# Patient Record
Sex: FEMALE | Race: WHITE | NOT HISPANIC OR LATINO | Employment: UNEMPLOYED | URBAN - METROPOLITAN AREA
[De-identification: names, ages, dates, MRNs, and addresses within clinical notes are randomized per-mention and may not be internally consistent; named-entity substitution may affect disease eponyms.]

---

## 2021-11-19 ENCOUNTER — TRANSCRIBE ORDERS (OUTPATIENT)
Dept: ADMINISTRATIVE | Facility: HOSPITAL | Age: 31
End: 2021-11-19

## 2021-11-19 DIAGNOSIS — Z36.9 1ST TRIMESTER SCREENING: Primary | ICD-10-CM

## 2021-11-24 ENCOUNTER — HOSPITAL ENCOUNTER (OUTPATIENT)
Dept: ULTRASOUND IMAGING | Facility: HOSPITAL | Age: 31
Discharge: HOME | End: 2021-11-24

## 2021-11-24 ENCOUNTER — TRANSCRIBE ORDERS (OUTPATIENT)
Dept: ADMINISTRATIVE | Facility: HOSPITAL | Age: 31
End: 2021-11-24

## 2021-11-24 ENCOUNTER — LAB (OUTPATIENT)
Dept: LAB | Facility: HOSPITAL | Age: 31
End: 2021-11-24

## 2021-11-24 DIAGNOSIS — Z36.9 ANTENATAL SCREENING ENCOUNTER: ICD-10-CM

## 2021-11-24 DIAGNOSIS — Z36.9 ANTENATAL SCREENING ENCOUNTER: Primary | ICD-10-CM

## 2021-11-24 DIAGNOSIS — Z36.9 1ST TRIMESTER SCREENING: ICD-10-CM

## 2021-11-24 PROCEDURE — 76811 OB US DETAILED SNGL FETUS: CPT

## 2021-12-16 LAB
EXTERNAL HEPATITIS B SURFACE ANTIGEN: NEGATIVE
EXTERNAL RUBELLA QUALITATIVE: NORMAL
EXTERNAL SYPHILIS ANTIBODY: NORMAL
HIV1 P24 AG SERPL QL IA: NORMAL

## 2022-01-12 ENCOUNTER — TRANSCRIBE ORDERS (OUTPATIENT)
Dept: ADMINISTRATIVE | Facility: HOSPITAL | Age: 32
End: 2022-01-12

## 2022-01-12 DIAGNOSIS — Z15.89 MTHFR MUTATION (METHYLENETETRAHYDROFOLATE REDUCTASE): Primary | ICD-10-CM

## 2022-01-27 ENCOUNTER — TRANSCRIBE ORDERS (OUTPATIENT)
Dept: ADMINISTRATIVE | Facility: HOSPITAL | Age: 32
End: 2022-01-27

## 2022-01-27 ENCOUNTER — HOSPITAL ENCOUNTER (OUTPATIENT)
Dept: ULTRASOUND IMAGING | Facility: HOSPITAL | Age: 32
Discharge: HOME | End: 2022-01-27
Admitting: OBSTETRICS & GYNECOLOGY

## 2022-01-27 DIAGNOSIS — E72.12 MTHFR DEFICIENCY COMPLICATING PREGNANCY, UNSPECIFIED TRIMESTER: Primary | ICD-10-CM

## 2022-01-27 DIAGNOSIS — Z15.89 MTHFR MUTATION (METHYLENETETRAHYDROFOLATE REDUCTASE): ICD-10-CM

## 2022-01-27 DIAGNOSIS — O99.280 MTHFR DEFICIENCY COMPLICATING PREGNANCY, UNSPECIFIED TRIMESTER: Primary | ICD-10-CM

## 2022-01-27 PROCEDURE — 76805 OB US >/= 14 WKS SNGL FETUS: CPT

## 2022-02-08 ENCOUNTER — HOSPITAL ENCOUNTER (OUTPATIENT)
Dept: ULTRASOUND IMAGING | Facility: HOSPITAL | Age: 32
Discharge: HOME | End: 2022-02-08
Admitting: OBSTETRICS & GYNECOLOGY

## 2022-02-08 DIAGNOSIS — E72.12 MTHFR DEFICIENCY COMPLICATING PREGNANCY, UNSPECIFIED TRIMESTER: ICD-10-CM

## 2022-02-08 DIAGNOSIS — O99.280 MTHFR DEFICIENCY COMPLICATING PREGNANCY, UNSPECIFIED TRIMESTER: ICD-10-CM

## 2022-02-08 PROCEDURE — 76818 FETAL BIOPHYS PROFILE W/NST: CPT

## 2022-02-11 LAB — EXTERNAL GROUP B STREP ANTIGEN: NORMAL

## 2022-02-14 ENCOUNTER — APPOINTMENT (OUTPATIENT)
Dept: LABOR AND DELIVERY | Facility: HOSPITAL | Age: 32
End: 2022-02-14

## 2022-02-15 ENCOUNTER — HOSPITAL ENCOUNTER (OUTPATIENT)
Dept: ULTRASOUND IMAGING | Facility: HOSPITAL | Age: 32
Discharge: HOME | End: 2022-02-15
Admitting: OBSTETRICS & GYNECOLOGY

## 2022-02-15 DIAGNOSIS — E72.12 MTHFR DEFICIENCY COMPLICATING PREGNANCY, UNSPECIFIED TRIMESTER: ICD-10-CM

## 2022-02-15 DIAGNOSIS — O99.280 MTHFR DEFICIENCY COMPLICATING PREGNANCY, UNSPECIFIED TRIMESTER: ICD-10-CM

## 2022-02-15 PROCEDURE — 76818 FETAL BIOPHYS PROFILE W/NST: CPT

## 2022-02-23 ENCOUNTER — TRANSCRIBE ORDERS (OUTPATIENT)
Dept: ADMINISTRATIVE | Facility: HOSPITAL | Age: 32
End: 2022-02-23

## 2022-02-23 DIAGNOSIS — Z15.89 MTHFR MUTATION (METHYLENETETRAHYDROFOLATE REDUCTASE): Primary | ICD-10-CM

## 2022-02-25 ENCOUNTER — HOSPITAL ENCOUNTER (OUTPATIENT)
Dept: ULTRASOUND IMAGING | Facility: HOSPITAL | Age: 32
Discharge: HOME | End: 2022-02-25
Admitting: OBSTETRICS & GYNECOLOGY

## 2022-02-25 DIAGNOSIS — O99.280 MTHFR DEFICIENCY COMPLICATING PREGNANCY, UNSPECIFIED TRIMESTER: ICD-10-CM

## 2022-02-25 DIAGNOSIS — E72.12 MTHFR DEFICIENCY COMPLICATING PREGNANCY, UNSPECIFIED TRIMESTER: ICD-10-CM

## 2022-02-25 PROCEDURE — 76818 FETAL BIOPHYS PROFILE W/NST: CPT

## 2022-03-03 ENCOUNTER — HOSPITAL ENCOUNTER (INPATIENT)
Facility: HOSPITAL | Age: 32
LOS: 2 days | Discharge: HOME | End: 2022-03-05
Attending: OBSTETRICS & GYNECOLOGY | Admitting: OBSTETRICS & GYNECOLOGY

## 2022-03-03 LAB
ABO GROUP BLD: NORMAL
ABO GROUP BLD: NORMAL
BLD GP AB SCN SERPL QL: NEGATIVE
DEPRECATED RDW RBC AUTO: 41.9 FL (ref 37–54)
ERYTHROCYTE [DISTWIDTH] IN BLOOD BY AUTOMATED COUNT: 12.8 % (ref 12.3–15.4)
HCT VFR BLD AUTO: 30.6 % (ref 34–46.6)
HGB BLD-MCNC: 10.8 G/DL (ref 12–15.9)
MCH RBC QN AUTO: 32 PG (ref 26.6–33)
MCHC RBC AUTO-ENTMCNC: 35.3 G/DL (ref 31.5–35.7)
MCV RBC AUTO: 90.5 FL (ref 79–97)
PLATELET # BLD AUTO: 202 10*3/MM3 (ref 140–450)
PMV BLD AUTO: 9.7 FL (ref 6–12)
RBC # BLD AUTO: 3.38 10*6/MM3 (ref 3.77–5.28)
RH BLD: POSITIVE
RH BLD: POSITIVE
T&S EXPIRATION DATE: NORMAL
WBC NRBC COR # BLD: 8.52 10*3/MM3 (ref 3.4–10.8)

## 2022-03-03 PROCEDURE — 86901 BLOOD TYPING SEROLOGIC RH(D): CPT

## 2022-03-03 PROCEDURE — U0004 COV-19 TEST NON-CDC HGH THRU: HCPCS | Performed by: OBSTETRICS & GYNECOLOGY

## 2022-03-03 PROCEDURE — 86900 BLOOD TYPING SEROLOGIC ABO: CPT

## 2022-03-03 PROCEDURE — 86901 BLOOD TYPING SEROLOGIC RH(D): CPT | Performed by: OBSTETRICS & GYNECOLOGY

## 2022-03-03 PROCEDURE — 85027 COMPLETE CBC AUTOMATED: CPT | Performed by: OBSTETRICS & GYNECOLOGY

## 2022-03-03 PROCEDURE — 86850 RBC ANTIBODY SCREEN: CPT | Performed by: OBSTETRICS & GYNECOLOGY

## 2022-03-03 PROCEDURE — 86900 BLOOD TYPING SEROLOGIC ABO: CPT | Performed by: OBSTETRICS & GYNECOLOGY

## 2022-03-03 RX ORDER — LIDOCAINE HYDROCHLORIDE 10 MG/ML
5 INJECTION, SOLUTION EPIDURAL; INFILTRATION; INTRACAUDAL; PERINEURAL AS NEEDED
Status: DISCONTINUED | OUTPATIENT
Start: 2022-03-03 | End: 2022-03-04

## 2022-03-03 RX ORDER — SODIUM CHLORIDE, SODIUM LACTATE, POTASSIUM CHLORIDE, CALCIUM CHLORIDE 600; 310; 30; 20 MG/100ML; MG/100ML; MG/100ML; MG/100ML
125 INJECTION, SOLUTION INTRAVENOUS CONTINUOUS
Status: DISCONTINUED | OUTPATIENT
Start: 2022-03-03 | End: 2022-03-04

## 2022-03-03 RX ORDER — SODIUM CHLORIDE 0.9 % (FLUSH) 0.9 %
10 SYRINGE (ML) INJECTION EVERY 12 HOURS SCHEDULED
Status: DISCONTINUED | OUTPATIENT
Start: 2022-03-03 | End: 2022-03-04

## 2022-03-03 RX ORDER — MAGNESIUM CARB/ALUMINUM HYDROX 105-160MG
30 TABLET,CHEWABLE ORAL ONCE
Status: DISCONTINUED | OUTPATIENT
Start: 2022-03-03 | End: 2022-03-04

## 2022-03-03 RX ORDER — ASPIRIN 81 MG/1
81 TABLET, CHEWABLE ORAL DAILY
COMMUNITY

## 2022-03-03 RX ORDER — FOLIC ACID 1 MG/1
1 TABLET ORAL DAILY
COMMUNITY

## 2022-03-03 RX ORDER — SODIUM CHLORIDE 0.9 % (FLUSH) 0.9 %
10 SYRINGE (ML) INJECTION AS NEEDED
Status: DISCONTINUED | OUTPATIENT
Start: 2022-03-03 | End: 2022-03-04

## 2022-03-03 RX ADMIN — SODIUM CHLORIDE, POTASSIUM CHLORIDE, SODIUM LACTATE AND CALCIUM CHLORIDE 999 ML/HR: 600; 310; 30; 20 INJECTION, SOLUTION INTRAVENOUS at 21:35

## 2022-03-03 RX ADMIN — SODIUM CHLORIDE, POTASSIUM CHLORIDE, SODIUM LACTATE AND CALCIUM CHLORIDE 125 ML/HR: 600; 310; 30; 20 INJECTION, SOLUTION INTRAVENOUS at 22:36

## 2022-03-04 ENCOUNTER — APPOINTMENT (OUTPATIENT)
Dept: ULTRASOUND IMAGING | Facility: HOSPITAL | Age: 32
End: 2022-03-04

## 2022-03-04 PROBLEM — Z3A.38 38 WEEKS GESTATION OF PREGNANCY: Status: RESOLVED | Noted: 2022-03-04 | Resolved: 2022-03-04

## 2022-03-04 PROBLEM — Z3A.38 38 WEEKS GESTATION OF PREGNANCY: Status: ACTIVE | Noted: 2022-03-04

## 2022-03-04 LAB
BASE EXCESS BLDCOA CALC-SCNC: 2.5 MMOL/L
HCO3 BLDCOA-SCNC: 26 MMOL/L
PCO2 BLDCOA: 36.8 MMHG (ref 33–49)
PH BLDCOA: 7.47 PH UNITS (ref 7.21–7.31)
PO2 BLDCOA: <40.5 MMHG
SARS-COV-2 RNA PNL SPEC NAA+PROBE: NOT DETECTED

## 2022-03-04 PROCEDURE — 82803 BLOOD GASES ANY COMBINATION: CPT | Performed by: OBSTETRICS & GYNECOLOGY

## 2022-03-04 RX ORDER — MISOPROSTOL 200 UG/1
600 TABLET ORAL ONCE
Status: COMPLETED | OUTPATIENT
Start: 2022-03-04 | End: 2022-03-04

## 2022-03-04 RX ORDER — METHYLERGONOVINE MALEATE 0.2 MG/ML
200 INJECTION INTRAVENOUS ONCE AS NEEDED
Status: DISCONTINUED | OUTPATIENT
Start: 2022-03-04 | End: 2022-03-04 | Stop reason: HOSPADM

## 2022-03-04 RX ORDER — MISOPROSTOL 200 UG/1
800 TABLET ORAL ONCE AS NEEDED
Status: DISCONTINUED | OUTPATIENT
Start: 2022-03-04 | End: 2022-03-04 | Stop reason: HOSPADM

## 2022-03-04 RX ORDER — ONDANSETRON 4 MG/1
4 TABLET, FILM COATED ORAL EVERY 8 HOURS PRN
Status: DISCONTINUED | OUTPATIENT
Start: 2022-03-04 | End: 2022-03-05 | Stop reason: HOSPADM

## 2022-03-04 RX ORDER — MISOPROSTOL 200 UG/1
TABLET ORAL
Status: DISPENSED
Start: 2022-03-04 | End: 2022-03-04

## 2022-03-04 RX ORDER — FERROUS SULFATE 325(65) MG
325 TABLET ORAL 2 TIMES DAILY WITH MEALS
Status: DISCONTINUED | OUTPATIENT
Start: 2022-03-04 | End: 2022-03-05 | Stop reason: HOSPADM

## 2022-03-04 RX ORDER — IBUPROFEN 600 MG/1
600 TABLET ORAL EVERY 6 HOURS
Status: DISCONTINUED | OUTPATIENT
Start: 2022-03-04 | End: 2022-03-05 | Stop reason: HOSPADM

## 2022-03-04 RX ORDER — DOCUSATE SODIUM 100 MG/1
100 CAPSULE, LIQUID FILLED ORAL DAILY
Status: DISCONTINUED | OUTPATIENT
Start: 2022-03-04 | End: 2022-03-05 | Stop reason: HOSPADM

## 2022-03-04 RX ORDER — CALCIUM CARBONATE 200(500)MG
1 TABLET,CHEWABLE ORAL 3 TIMES DAILY PRN
Status: DISCONTINUED | OUTPATIENT
Start: 2022-03-04 | End: 2022-03-05 | Stop reason: HOSPADM

## 2022-03-04 RX ORDER — HYDROCODONE BITARTRATE AND ACETAMINOPHEN 5; 325 MG/1; MG/1
1 TABLET ORAL EVERY 4 HOURS PRN
Status: DISCONTINUED | OUTPATIENT
Start: 2022-03-04 | End: 2022-03-05 | Stop reason: HOSPADM

## 2022-03-04 RX ORDER — ASPIRIN 81 MG/1
81 TABLET, CHEWABLE ORAL DAILY
Status: DISCONTINUED | OUTPATIENT
Start: 2022-03-04 | End: 2022-03-05 | Stop reason: HOSPADM

## 2022-03-04 RX ORDER — LIDOCAINE HYDROCHLORIDE 10 MG/ML
INJECTION, SOLUTION EPIDURAL; INFILTRATION; INTRACAUDAL; PERINEURAL
Status: DISCONTINUED
Start: 2022-03-04 | End: 2022-03-04 | Stop reason: WASHOUT

## 2022-03-04 RX ORDER — IBUPROFEN 600 MG/1
600 TABLET ORAL EVERY 6 HOURS SCHEDULED
Status: DISCONTINUED | OUTPATIENT
Start: 2022-03-04 | End: 2022-03-04

## 2022-03-04 RX ORDER — CARBOPROST TROMETHAMINE 250 UG/ML
250 INJECTION, SOLUTION INTRAMUSCULAR
Status: DISCONTINUED | OUTPATIENT
Start: 2022-03-04 | End: 2022-03-04 | Stop reason: HOSPADM

## 2022-03-04 RX ORDER — OXYTOCIN-SODIUM CHLORIDE 0.9% IV SOLN 30 UNIT/500ML 30-0.9/5 UT/ML-%
SOLUTION INTRAVENOUS
Status: COMPLETED
Start: 2022-03-04 | End: 2022-03-04

## 2022-03-04 RX ADMIN — WITCH HAZEL 1 PAD: 500 SOLUTION RECTAL; TOPICAL at 03:28

## 2022-03-04 RX ADMIN — FERROUS SULFATE TAB 325 MG (65 MG ELEMENTAL FE) 325 MG: 325 (65 FE) TAB at 07:37

## 2022-03-04 RX ADMIN — IBUPROFEN 600 MG: 600 TABLET ORAL at 22:27

## 2022-03-04 RX ADMIN — IBUPROFEN 600 MG: 600 TABLET ORAL at 16:33

## 2022-03-04 RX ADMIN — ASPIRIN 81 MG CHEWABLE TABLET 81 MG: 81 TABLET CHEWABLE at 08:51

## 2022-03-04 RX ADMIN — IBUPROFEN 600 MG: 600 TABLET ORAL at 03:27

## 2022-03-04 RX ADMIN — OXYTOCIN 30 UNITS: 10 INJECTION, SOLUTION INTRAMUSCULAR; INTRAVENOUS at 02:35

## 2022-03-04 RX ADMIN — Medication: at 03:28

## 2022-03-04 RX ADMIN — MISOPROSTOL 600 MCG: 200 TABLET ORAL at 07:37

## 2022-03-04 RX ADMIN — IBUPROFEN 600 MG: 600 TABLET ORAL at 10:46

## 2022-03-04 RX ADMIN — FERROUS SULFATE TAB 325 MG (65 MG ELEMENTAL FE) 325 MG: 325 (65 FE) TAB at 17:21

## 2022-03-04 RX ADMIN — DOCUSATE SODIUM 100 MG: 100 CAPSULE ORAL at 08:13

## 2022-03-04 NOTE — L&D DELIVERY NOTE
CANDI Gibbons  Vaginal Delivery Note   Review the Delivery Report for details.       Delivery     Delivery: Vaginal, Spontaneous     YOB: 2023    Time of Birth:  Gestational Age 2:27 AM   38w2d     Anesthesia: None     Delivering clinician: Faby Lewis    Forceps?   No   Vacuum? No    Shoulder dystocia present: No        Delivery narrative: Presented in spontaneous labor, progressed to stage II labor, spontaneous rupture of membranes clear fluids.  Spontaneous vaginal delivery of a liveborn female infant over intact perineum.  Nuchal cord x1 reduced at the perineum.  Infant bulb suctioned and laid on the mother's abdomen.  Nursery personnel present.  Delayed cord clamping.  Umbilical cord cut.  Samples of blood collected for arterial blood gas analysis and N[place] testing.  Spontaneous delivery intact placenta three-vessel cord and trailing membranes.  Perineum intact.   mL Mom infant in the LDR in satisfactory condition father at bedside.    Infant    Findings: female  infant     Infant observations: Weight: 2770 g (6 lb 1.7 oz)   Length: 19  in  Observations/Comments:        Apgars: 8  @ 1 minute /    9  @ 5 minutes   Infant Name:      Placenta, Cord, and Fluid    Placenta delivered  Spontaneous  at   3/4/2022  2:34 AM     Cord: 3 vessels  present.   Nuchal Cord?  yes; Number of nuchal loops present:  1    Cord blood obtained: Yes    Cord gases obtained:  Yes    Cord gas results: Venous:  No results found for: PHCVEN    Arterial:  No results found for: PHCART     Repair    Episiotomy: None     No    Lacerations: No   Estimated Blood Loss: Est. Blood Loss (mL): 300 mL (Filed from Delivery Summary) (03/04/22 0191)             Quantitative Blood Loss:                  Complications  none    Disposition  Mother to Mother Baby/Postpartum  in stable condition currently.  Baby to remains with mom  in stable condition currently.      Faby Lewis MD  03/04/22  02:58 EST

## 2022-03-04 NOTE — PLAN OF CARE
Goal Outcome Evaluation:   Patient healing well, pain under control, and breastfeeding baby.

## 2022-03-04 NOTE — H&P
CANDI Gibbons  Obstetric History and Physical    Chief Complaint   Patient presents with   • Contractions       Subjective     Patient is a 31 y.o. female  currently at 38w2d, who presents with contractions.    Her prenatal care is complicated by  insufficient prenatal care .  Her previous obstetric/gynecological history is noted for is non-contributory.    The following portions of the patients history were reviewed and updated as appropriate: current medications, allergies, past medical history, past surgical history, past family history, past social history, problem list and Notable for methyltetrahydrofolate reductase deficiency .       Prenatal Information:  Prenatal Results     POC Urine Glucose/Protein     Test Value Reference Range Date Time    Urine Glucose        Urine Protein              Initial Prenatal Labs     Test Value Reference Range Date Time    Hemoglobin  10.8 g/dL 12.0 - 15.9 22    Hematocrit  30.6 % 34.0 - 46.6 22    Platelets  202 10*3/mm3 140 - 450 22    Rubella IgG ^ Immune   21     Hepatitis B SAg ^ Negative   21     Hepatitis C Ab        RPR        ABO  O   22    Rh  Positive   22    Antibody Screen  Negative   22    HIV ^ Non-Reactive   21     Urine Culture        Gonorrhea        Chlamydia        TSH        HgB A1c               2nd and 3rd Trimester     Test Value Reference Range Date Time    Hemoglobin (repeated)  10.8 g/dL 12.0 - 15.9 22    Hematocrit (repeated)  30.6 % 34.0 - 46.6 22    Platelets   202 10*3/mm3 140 - 450 22    GCT        Antibody Screen (repeated)  Negative   22    GTT Fasting        GTT 1 Hr        GTT 2 Hr        GTT 3 Hr        Group B Strep ^ NEG   22           Drug Screening     Test Value Reference Range Date Time    Amphetamine Screen        Barbiturate Screen        Benzodiazepine Screen        Methadone Screen         Phencyclidine Screen        Opiates Screen        THC Screen        Cocaine Screen        Propoxyphene Screen        Buprenorphine Screen        Methamphetamine Screen        Oxycodone Screen        Tricyclic Antidepressants Screen              Other (Risk screening)     Test Value Reference Range Date Time    Varicella IgG        Parvovirus IgG        CMV IgG        Cystic Fibrosis        Hemoglobin electrophoresis        NIPT        MSAFP-4        AFP (for NTD only)              Legend    ^: Historical                      External Prenatal Results     Pregnancy Outside Results - Transcribed From Office Records - See Scanned Records For Details     Test Value Date Time    ABO  O  03/03/22 2158    Rh  Positive  03/03/22 2158    Antibody Screen  Negative  03/03/22 2131    Varicella IgG       Rubella ^ Immune  12/16/21     Hgb  10.8 g/dL 03/03/22 2131    Hct  30.6 % 03/03/22 2131    Glucose Fasting GTT       Glucose Tolerance Test 1 hour       Glucose Tolerance Test 3 hour       Gonorrhea (discrete)       Chlamydia (discrete)       RPR       VDRL       Syphilis Antibody ^ Non-Reactive  12/16/21     HBsAg ^ Negative  12/16/21     Herpes Simplex Virus PCR       Herpes Simplex VIrus Culture       HIV ^ Non-Reactive  12/16/21     Hep C RNA Quant PCR       Hep C Antibody       AFP       Group B Strep ^ NEG  02/11/22     GBS Susceptibility to Clindamycin       GBS Susceptibility to Erythromycin       Fetal Fibronectin       Genetic Testing, Maternal Blood             Drug Screening     Test Value Date Time    Urine Drug Screen       Amphetamine Screen       Barbiturate Screen       Benzodiazepine Screen       Methadone Screen       Phencyclidine Screen       Opiates Screen       THC Screen       Cocaine Screen       Propoxyphene Screen       Buprenorphine Screen       Methamphetamine Screen       Oxycodone Screen       Tricyclic Antidepressants Screen             Legend    ^: Historical                         Past OB  History:     OB History    Para Term  AB Living   7 4 0 0 2 4   SAB IAB Ectopic Molar Multiple Live Births   2 0 0 0 0 4      # Outcome Date GA Lbr Danielito/2nd Weight Sex Delivery Anes PTL Lv   7 Current            6 SAB            5 SAB            4 Para            3 Para            2 Para            1 Para                Past Medical History: Past Medical History:   Diagnosis Date   • MTHFR mutation       Past Surgical History History reviewed. No pertinent surgical history.   Family History: No family history on file.   Social History:  reports that she has never smoked. She has never used smokeless tobacco.   reports previous alcohol use.   reports previous drug use.        Review of Systems   Constitutional: Negative.    HENT: Negative.    Eyes: Negative.    Respiratory: Negative.    Cardiovascular: Negative.    Gastrointestinal: Positive for abdominal pain.        Contractions   Endocrine: Negative.    Genitourinary: Negative.    Musculoskeletal: Negative.    Skin: Negative.    Allergic/Immunologic: Negative.    Neurological: Negative.    Hematological:        MTHFR deficiency   Psychiatric/Behavioral: Negative.          Objective     Vital Signs Range for the last 24 hours  Temperature: Temp:  [98.1 °F (36.7 °C)-98.9 °F (37.2 °C)] 98.1 °F (36.7 °C)   Temp Source: Temp src: Oral   BP: BP: (102-123)/(65-80) 114/72   Pulse: Heart Rate:  [] 77   Respirations: Resp:  [18] 18   SPO2: SpO2:  [100 %] 100 %   O2 Amount (l/min):     O2 Devices     Weight: Weight:  [61.2 kg (135 lb)] 61.2 kg (135 lb)     Physical Examination: General appearance -awake, alert, oriented, uncomfortable with labor  Mental status - alert, oriented to person, place, and time  Eyes - sclera anicteric  Mouth - mucous membranes moist, pharynx normal without lesions  Neck - supple, no significant adenopathy  Chest -symmetrical movement,  Abdomen -gravid; uterine contractions present  Pelvic - normal external genitalia, vulva,  vagina, cervix, uterus and adnexa, varicosities present  Neurological - alert, oriented, normal speech, no focal findings or movement disorder noted  Extremities - no edema, redness or tenderness in the calves or thighs, varicose veins noted  Skin - normal coloration and turgor, no rashes, no suspicious skin lesions noted    Presentation:  Vertex   Cervix: Exam by:     Dilation: Cervical Dilation (cm): 8   Effacement: Cervical Effacement: 100%   Station:       Fetal Heart Rate Assessment   Method: Fetal HR Assessment Method: external   Beats/min: Fetal HR (beats/min): 130   Baseline: Fetal Heart Baseline Rate: normal range   Variability: Fetal HR Variability: moderate (amplitude range 6 to 25 bpm)   Accels: Fetal HR Accelerations: lasting at least 15 seconds, greater than/equal to 15 bpm   Decels: Fetal HR Decelerations: variable, prolonged   Tracing Category: Fetal HR Tracing Category: Category I     Uterine Assessment   Method: Method: external tocotransducer   Frequency (min): Contraction Frequency (Minutes): 3-5   Ctx Count in 10 min:     Duration:     Intensity: Contraction Intensity: moderate by palpation   Intensity by IUPC:     Resting Tone: Uterine Resting Tone: soft by palpation   Resting Tone by IUPC:     [place] Units:       Laboratory Results: Reviewed in [facility]  Radiology Review: Not applicable  Other Studies: Not applicable    Assessment/Plan       38 weeks gestation of pregnancy      Assessment & Plan    Assessment:  1.  Intrauterine pregnancy at 38w2d gestation with reactive fetal status.    2.  labor  with ROM  3.  Obstetrical history significant for is non-contributory.  4.  GBS status:   External Strep Group B Ag   Date Value Ref Range Status   02/11/2022 NEG  Final       Plan:  1. Vaginal anticipated  2. Plan of care has been reviewed with patient  3.  Risks, benefits of treatment plan have been discussed.  4.  All questions have been answered.        Faby Lewis MD  3/4/2022  02:44  EST

## 2022-03-04 NOTE — LACTATION NOTE
LC in to see this p5 patient and her . She states she  all her other children with no issues. LC was able to see this feeding and patient was able to latch infant easily. Good swallows seen and heard. LC discussed with patient about  normal  breastfeeding behaviors and breastfeeding expectations for the next 2 days and to call as needed for lactation assistance . Patient showed good understanding.

## 2022-03-05 VITALS
DIASTOLIC BLOOD PRESSURE: 77 MMHG | BODY MASS INDEX: 24.84 KG/M2 | OXYGEN SATURATION: 100 % | RESPIRATION RATE: 16 BRPM | HEART RATE: 73 BPM | TEMPERATURE: 98.2 F | HEIGHT: 62 IN | WEIGHT: 135 LBS | SYSTOLIC BLOOD PRESSURE: 104 MMHG

## 2022-03-05 RX ORDER — IBUPROFEN 600 MG/1
600 TABLET ORAL EVERY 6 HOURS
Qty: 30 TABLET | Refills: 0 | Status: SHIPPED | OUTPATIENT
Start: 2022-03-05

## 2022-03-05 RX ADMIN — ASPIRIN 81 MG CHEWABLE TABLET 81 MG: 81 TABLET CHEWABLE at 07:56

## 2022-03-05 RX ADMIN — IBUPROFEN 600 MG: 600 TABLET ORAL at 05:04

## 2022-03-05 RX ADMIN — FERROUS SULFATE TAB 325 MG (65 MG ELEMENTAL FE) 325 MG: 325 (65 FE) TAB at 07:57

## 2022-03-05 RX ADMIN — DOCUSATE SODIUM 100 MG: 100 CAPSULE ORAL at 07:57

## 2022-03-05 NOTE — EXTERNAL PATIENT INSTRUCTIONS
Patient Education   Table of Contents       Ibuprofen Oral Tablets and Capsules       Postpartum Care After Vaginal Delivery       Ferrous sulfate       Recovery After Vaginal Birth       Understanding  Depression     To view videos and all your education online visit,   [email or web address]   or scan this QR code with your smartphone.                  Ibuprofen Oral Tablets and Capsules     What is this medicine?   IBUPROFEN (eye BYOO proe fen) is a non-steroidal anti-inflammatory drug, also known as an NSAID. It treats pain, inflammation, and swelling. It also reduces fever and minor aches and pains caused by the cold, flu, or a sore throat.   This medicine may be used for other purposes; ask your health care provider or pharmacist if you have questions.   COMMON BRAND NAME(S): Advil, Advil Alex Strength, Advil Migraine, Genpril, Ibren, IBU, Ibupak, Midol, Midol Cramps and Body Aches, Motrin, Motrin IB, Motrin Alex Strength, Motrin Migraine Pain, [place]-8, Toxicology Saliva Collection   What should I tell my health care provider before I take this medicine?   They need to know if you have any of these conditions:         bleeding disorder       coronary artery bypass graft (CABG) within the past 2 weeks       dehydration       diarrhea       heart attack       heart disease       heart failure       high blood pressure       if you often drink alcohol       kidney disease       liver disease       lung or breathing disease (asthma)       receiving steroids like dexamethasone or prednisone       smoke tobacco cigarettes       stomach bleeding       stomach ulcers, other stomach or intestine problems       stroke       take drugs that treat or prevent blood clots       vomiting       an unusual or allergic reaction to ibuprofen, other medicines, foods, dyes, or preservatives       pregnant or trying to get pregnant       breast-feeding     How should I use this medicine?   Take this drug by  mouth. Take it as directed on the label. You can take it with or without food. If it upsets your stomach, take it with food.   Talk to your health care provider about the use of this drug in children. While it may be prescribed for children as young as 12 for selected conditions, precautions do apply.   Patients over 65 years of age may have a stronger reaction and need a smaller dose.   If you get this drug as a prescription, a special MedGuide will be given to you by the pharmacist with each prescription and refill. Be sure to read this information carefully each time.   Overdosage: If you think you have taken too much of this medicine contact a poison control center or emergency room at once.   NOTE: This medicine is only for you. Do not share this medicine with others.   What if I miss a dose?   If you take this drug on a regular basis, take it as soon as you can. If it is almost time for your next dose, take only that dose. Do not take double or extra doses.   What may interact with this medicine?   Do not take this medicine with any of the following medications:         cidofovir       ketorolac       methotrexate       pemetrexed     This medicine may also interact with the following medications:         alcohol       aspirin       diuretics       lithium       other drugs for inflammation like prednisone       warfarin     This list may not describe all possible interactions. Give your health care provider a list of all the medicines, herbs, non-prescription drugs, or dietary supplements you use. Also tell them if you smoke, drink alcohol, or use illegal drugs. Some items may interact with your medicine.   What should I watch for while using this medicine?   Visit your health care provider for regular checks on your progress. Tell your health care provider if your symptoms do not start to get better or if they get worse.   A painful sore throat or sore throat with high fevers, headaches, nausea, or vomiting  may be signs of a serious infection. Call your health care provider if these symptoms occur. Do not use this medicine for more than 2 days or give to children under 3 years of age unless your health care provider tells you to.   Do not take other medicines that contain aspirin, ibuprofen, or naproxen with this medicine. Side effects such as stomach upset, nausea, or ulcers may be more likely to occur. Many non-prescription medicines contain aspirin, ibuprofen, or naproxen. Always read labels carefully.   This medicine can cause serious ulcers and bleeding in the stomach. It can happen with no warning. Smoking, drinking alcohol, older age, and poor health can also increase risks. Call your health care provider right away if you have stomach pain or blood in your vomit or stool.   This medicine does not prevent a heart attack or stroke. This medicine may increase the chance of a heart attack or stroke. The chance may increase the longer you use this medicine or if you have heart disease. If you take aspirin to prevent a heart attack or stroke, talk to your health care provider about using this medicine.   Alcohol may interfere with the effect of this medicine. Avoid alcoholic drinks.   This medicine may cause serious skin reactions. They can happen weeks to months after starting the medicine. Contact your health care provider right away if you notice fevers or flu-like symptoms with a rash. The rash may be red or purple and then turn into blisters or peeling of the skin. Or, you might notice a red rash with swelling of the face, lips or lymph nodes in your neck or under your arms.   Talk to your health care provider if you are pregnant before taking this medicine. Taking this medicine between weeks 20 and 30 of pregnancy may harm your unborn baby. Your health care provider will monitor you closely if you need to take it. After 30 weeks of pregnancy, do not take this medicine.   You may get drowsy or dizzy. Do not  drive, use machinery, or do anything that needs mental alertness until you know how this medicine affects you. Do not stand up or sit up quickly, especially if you are an older patient. This reduces the risk of dizzy or fainting spells.   Be careful brushing or flossing your teeth or using a toothpick because you may get an infection or bleed more easily. If you have any dental work done, tell your dentist you are receiving this medicine.   This medicine may make it more difficult to get pregnant. Talk to your health care provider if you are concerned about your fertility.   What side effects may I notice from receiving this medicine?   Side effects that you should report to your doctor or health care provider as soon as possible:         allergic reactions (skin rash, itching or hives; swelling of the face, lips, or tongue)       aseptic meningitis (stiff neck; sensitivity to light; headache; drowsiness; fever; nausea, vomiting; rash)       bleeding (bloody or black, tarry stools; red or dark brown urine; spitting up blood or brown material that looks like coffee grounds; red spots on the skin; unusual bruising or bleeding from the eyes, gums, or nose)       blurred vision OR changes in vision       heart attack (trouble breathing; pain or tightness in the chest, neck, back or arms; unusually weak or tired)       heart failure (trouble breathing; fast, irregular heartbeat; sudden weight gain; swelling of the ankles, feet, hands; unusually weak or tired)       high potassium levels (chest pain; fast, irregular heartbeat; muscle weakness)       increase in blood pressure       infection (fever, chills, cough, sore throat, pain or trouble passing urine)       kidney injury (trouble passing urine or change in the amount of urine)       liver injury (dark yellow or brown urine; general ill feeling or flu-like symptoms; loss of appetite, right upper belly pain; unusually weak or tired, yellowing of the eyes or skin)        low blood pressure (dizziness; feeling faint or lightheaded, falls; unusually weak or tired)       low red blood cell counts (trouble breathing; feeling faint; lightheaded, falls; unusually weak or tired)       rash, fever, and swollen lymph nodes       redness, blistering, peeling, or loosening of the skin, including inside the mouth       stroke (changes in vision; confusion; trouble speaking or understanding; severe headaches; sudden numbness or weakness of the face, arm or leg; trouble walking; dizziness; loss of balance or coordination)     Side effects that usually do not require medical attention (report to your doctor or health care provider if they continue or are bothersome):         cough       constipation       diarrhea       dizziness       headache       upset stomach       vomiting     This list may not describe all possible side effects. Call your doctor for medical advice about side effects. You may report side effects to FDA at 7-268-HXR-0334.   Where should I keep my medicine?   Keep out of the reach of children and pets.   Store at room temperature between 20 and 25 degrees C (68 and 77 degrees F).   Get rid of any unused medicine after the expiration date.   To get rid of medicines that are no longer needed or have :         Take the medicine to a medicine take-back program. Check with your pharmacy or law enforcement to find a location.       If you cannot return the medicine, check the label or package insert to see if the medicine should be thrown out in the garbage or flushed down the toilet. If you are not sure, ask your health care provider. If it is safe to put it in the trash, empty the medicine out of the container. Mix the medicine with cat litter, dirt, coffee grounds, or other unwanted substance. Seal the mixture in a bag or container. Put it in the trash.     NOTE: This sheet is a summary. It may not cover all possible information. If you have questions about this medicine,  talk to your doctor, pharmacist, or health care provider.     ? 2021 Elsevier/Gold Standard (2021-05-14 12:06:36)         Postpartum Care After Vaginal Delivery     The following information offers guidance about how to care for yourself from the time you deliver your baby to 6?12 weeks after delivery (postpartum period). If you have problems or questions, contact your health care provider for more specific instructions.   Follow these instructions at home:   Vaginal bleeding        It is normal to have vaginal bleeding (lochia) after delivery. Wear a sanitary pad for bleeding and discharge.       During the first week after delivery, the amount and appearance of lochia is often similar to a menstrual period.       Over the next few weeks, it will gradually decrease to a dry, yellow-brown discharge.       For most women, lochia stops completely by 4?6 weeks after delivery, but can vary.      Change your sanitary pads frequently. Watch for any changes in your flow, such as:       A sudden increase in volume.       A change in color.      Large blood clots.       If you pass a blood clot from your vagina, save it and call your health care provider. Do not  flush blood clots down the toilet before talking with your health care provider.      Do not  use tampons or douches until your health care provider approves.       If you are not breastfeeding, your period should return 6?8 weeks after delivery. If you are feeding your baby breast milk only, your period may not return until you stop breastfeeding.     Perineal care            Keep the area between the vagina and the anus (perineum) clean and dry. Use medicated pads and pain-relieving sprays and creams as directed.      If you had a surgical cut in the perineum (episiotomy) or a tear, check the area for signs of infection until you are healed. Check for:       More redness, swelling, or pain.       Fluid or blood coming from the cut or tear.       Warmth.       Pus  or a bad smell.       You may be given a squirt bottle to use instead of wiping to clean the perineum area after you use the bathroom. Pat the area gently to dry it.       To relieve pain caused by an episiotomy, a tear, or swollen veins in the anus (hemorrhoids), take a warm sitz bath 2?3 times a day. In a sitz bath, the warm water should only come up to your hips and cover your buttocks.     Breast care         In the first few days after delivery, your breasts may feel heavy, full, and uncomfortable (breast engorgement). Milk may also leak from your breasts. Ask your health care provider about ways to help relieve the discomfort.      If you are breastfeeding:       Wear a bra that supports your breasts and fits well. Use breast pads to absorb milk that leaks.       Keep your nipples clean and dry. Apply creams and ointments as told.       You may have uterine contractions every time you breastfeed for up to several weeks after delivery. This helps your uterus return to its normal size.       If you have any problems with breastfeeding, notify your health care provider or lactation consultant.      If you are not breastfeeding:       Avoid touching your breasts. Do not  squeeze out (express) milk. Doing this can make your breasts produce more milk.       Wear a good-fitting bra and use cold packs to help with swelling.     Intimacy and sexuality        Ask your health care provider when you can engage in sexual activity. This may depend upon:       Your risk of infection.       How fast you are healing.       Your comfort and desire to engage in sexual activity.       You are able to get pregnant after delivery, even if you have not had your period. Talk with your health care provider about methods of birth control (contraception) or family planning if you desire future pregnancies.     Medicines         Take over-the-counter and prescription medicines only as told by your health care provider.       Take an  over-the-counter stool softener to help ease bowel movements as told by your health care provider.       If you were prescribed an antibiotic medicine, take it as told by your health care provider. Do not  stop taking the antibiotic even if you start to feel better.       Review all previous and current prescriptions to check for possible transfer into breast milk.     Activity         Gradually return to your normal activities as told by your health care provider.       Rest as much as possible. Nap while your baby is sleeping.     Eating and drinking            Drink enough fluid to keep your urine pale yellow.       To help prevent or relieve constipation, eat high-fiber foods every day.       Choose healthy eating to support breastfeeding or weight loss goals.       Take your prenatal vitamins until your health care provider tells you to stop.         General tips/recommendations        Do not  use any products that contain nicotine or tobacco. These products include cigarettes, chewing tobacco, and vaping devices, such as e-cigarettes. If you need help quitting, ask your health care provider.      Do not  drink alcohol, especially if you are breastfeeding.      Do not  take medications or drugs that are not prescribed to you, especially if you are breastfeeding.       Visit your health care provider for a postpartum checkup within the first 3?6 weeks after delivery.       Complete a comprehensive postpartum visit no later than 12 weeks after delivery.       Keep all follow-up visits for you and your baby.     Contact a health care provider if:         You feel unusually sad or worried.       Your breasts become red, painful, or hard.       You have a fever or other signs of an infection.       You have bleeding that is soaking through one pad an hour or you have blood clots.       You have a severe headache that doesn't go away or you have vision changes.       You have nausea and vomiting and are unable to eat  or drink anything for 24 hours.     Get help right away if:         You have chest pain or difficulty breathing.       You have sudden, severe leg pain.       You faint or have a seizure.       You have thoughts about hurting yourself or your baby.     If you ever feel like you may hurt yourself or others, or have thoughts about taking your own life, get help right away. Go to your nearest emergency department or:      Call your local emergency services (911 in the [place]      The National Suicide Prevention Lifeline at 1-469.762.4849. This suicide crisis helpline is open 24 hours a day.      Text the Crisis Text Line at 640420 (in the [place]     Summary         The period of time after you deliver your  up to 6?12 weeks after delivery is called the postpartum period.       Keep all follow-up visits for you and your baby.       Review all previous and current prescriptions to check for possible transfer into breast milk.       Contact a health care provider if you feel unusually sad or worried during the postpartum period.     This information is not intended to replace advice given to you by your health care provider. Make sure you discuss any questions you have with your health care provider.     Document Released: 10/14/2008Document Revised: 1Document Reviewed: 2021     [employer] Patient Education ?  [employer]

## 2022-03-05 NOTE — DISCHARGE SUMMARY
CANDI Gibbons  Delivery Discharge Summary    Primary OB Clinician:     EDC: Estimated Date of Delivery: 3/16/22    Gestational Age:38w2d    Antepartum complications: Late prenatal care and history of MTHFR deficiency    Date of Delivery: 3/4/2022   Time of Delivery: 2:27 AM     Delivered By:  Faby Lewis     Delivery Type: Vaginal, Spontaneous      Tubal Ligation: n/a    Baby:female  infant;   Apgar:  8  @ 1 minute /   Apgar:  9  @ 5 minutes   Weight: 2770 g (6 lb 1.7 oz)    Length: 19     Anesthesia: None      Intrapartum complications: None    Laceration: No    Episiotomy: No    Placenta: Spontaneous     Feeding method: Breastfeeding Status: Yes    Rh Immune globulin given: not applicable    Rubella vaccine given: not applicable    Discharge Date: 3/5/2022; Discharge Time: 09:19 EST        Plan:      Follow-up appointment with Dr. Lewis in 2 weeks.     Electronically signed by Faby Lewis MD, 22, 9:19 AM EST.

## 2022-03-05 NOTE — PLAN OF CARE
Goal Outcome Evaluation:                 Problem: Adjustment to Role Transition (Postpartum Vaginal Delivery)  Goal: Successful Maternal Role Transition  Outcome: Unable to Meet, Plan Revised     Problem: Bleeding (Postpartum Vaginal Delivery)  Goal: Hemostasis  Outcome: Unable to Meet, Plan Revised     Problem: Infection (Postpartum Vaginal Delivery)  Goal: Absence of Infection Signs and Symptoms  Outcome: Unable to Meet, Plan Revised     Problem: Pain (Postpartum Vaginal Delivery)  Goal: Acceptable Pain Control  Outcome: Unable to Meet, Plan Revised     Problem: Urinary Retention (Postpartum Vaginal Delivery)  Goal: Effective Urinary Elimination  Outcome: Unable to Meet, Plan Revised     Problem: Breastfeeding  Goal: Effective Breastfeeding  Outcome: Unable to Meet, Plan Revised

## 2022-03-05 NOTE — PLAN OF CARE
Problem: Adult Inpatient Plan of Care  Goal: Plan of Care Review  Outcome: Ongoing, Progressing  Goal: Patient-Specific Goal (Individualized)  Outcome: Ongoing, Progressing  Goal: Absence of Hospital-Acquired Illness or Injury  Outcome: Ongoing, Progressing  Goal: Optimal Comfort and Wellbeing  Outcome: Ongoing, Progressing  Goal: Readiness for Transition of Care  Outcome: Ongoing, Progressing     Problem: Adjustment to Role Transition (Postpartum Vaginal Delivery)  Goal: Successful Maternal Role Transition  Outcome: Ongoing, Progressing     Problem: Bleeding (Postpartum Vaginal Delivery)  Goal: Hemostasis  Outcome: Ongoing, Progressing     Problem: Infection (Postpartum Vaginal Delivery)  Goal: Absence of Infection Signs/Symptoms  Outcome: Ongoing, Progressing     Problem: Pain (Postpartum Vaginal Delivery)  Goal: Acceptable Pain Control  Outcome: Ongoing, Progressing     Problem: Urinary Retention (Postpartum Vaginal Delivery)  Goal: Effective Urinary Elimination  Outcome: Ongoing, Progressing     Problem: Breastfeeding  Goal: Effective Breastfeeding  Outcome: Ongoing, Progressing   Goal Outcome Evaluation:                 Problem: Adjustment to Role Transition (Postpartum Vaginal Delivery)  Goal: Successful Maternal Role Transition  Outcome: Unable to Meet, Plan Revised     Problem: Bleeding (Postpartum Vaginal Delivery)  Goal: Hemostasis  Outcome: Unable to Meet, Plan Revised     Problem: Infection (Postpartum Vaginal Delivery)  Goal: Absence of Infection Signs and Symptoms  Outcome: Unable to Meet, Plan Revised     Problem: Pain (Postpartum Vaginal Delivery)  Goal: Acceptable Pain Control  Outcome: Unable to Meet, Plan Revised     Problem: Urinary Retention (Postpartum Vaginal Delivery)  Goal: Effective Urinary Elimination  Outcome: Unable to Meet, Plan Revised     Problem: Breastfeeding  Goal: Effective Breastfeeding  Outcome: Unable to Meet, Plan Revised

## 2022-03-05 NOTE — LACTATION NOTE
Pt states she has noted some clicking noises at times that baby is latched, she is unlatching and relatching when this occurs and when baby is latched deeply this does not happen. D/C instructions gone over, included hand hygiene, respiratory hygiene and breastfeeding when mom is sick, LC encouraged mom to see pediatrician two days from discharge for follow up.  LC discussed  breastfeeding behaviors, first two weeks of breastfeeding expectations, encouraged her to breastfeed/pump frequently for good milk supply. LC discussed nipple care, plugged ducts, engorgement, and breast infection. LC informed mom that LC was available after D/C for assistance with breastfeeding.

## 2022-03-16 ENCOUNTER — TELEPHONE (OUTPATIENT)
Dept: LACTATION | Facility: HOSPITAL | Age: 32
End: 2022-03-16

## 2022-03-16 NOTE — TELEPHONE ENCOUNTER
LC called to check with this patient and her breastfeeding progress. Patient states breastfeeding is going well and has no concerns. She states she has the lactation dept number to call if she has any needs.

## 2024-12-11 ENCOUNTER — APPOINTMENT (OUTPATIENT)
Facility: HOSPITAL | Age: 34
End: 2024-12-11
Payer: MEDICAID

## 2024-12-11 ENCOUNTER — HOSPITAL ENCOUNTER (EMERGENCY)
Facility: HOSPITAL | Age: 34
Discharge: HOME | End: 2024-12-11
Attending: EMERGENCY MEDICINE | Admitting: EMERGENCY MEDICINE
Payer: MEDICAID

## 2024-12-11 VITALS
RESPIRATION RATE: 17 BRPM | WEIGHT: 141.54 LBS | OXYGEN SATURATION: 99 % | HEIGHT: 63 IN | BODY MASS INDEX: 25.08 KG/M2 | DIASTOLIC BLOOD PRESSURE: 86 MMHG | HEART RATE: 92 BPM | SYSTOLIC BLOOD PRESSURE: 118 MMHG | TEMPERATURE: 98.1 F

## 2024-12-11 DIAGNOSIS — M79.605 LEFT LEG PAIN: Primary | ICD-10-CM

## 2024-12-11 DIAGNOSIS — M54.32 LEFT SIDED SCIATICA: ICD-10-CM

## 2024-12-11 LAB
BH CV LOWER VASCULAR LEFT COMMON FEMORAL AUGMENT: NORMAL
BH CV LOWER VASCULAR LEFT COMMON FEMORAL COMPETENT: NORMAL
BH CV LOWER VASCULAR LEFT COMMON FEMORAL COMPRESS: NORMAL
BH CV LOWER VASCULAR LEFT COMMON FEMORAL PHASIC: NORMAL
BH CV LOWER VASCULAR LEFT COMMON FEMORAL SPONT: NORMAL
BH CV LOWER VASCULAR LEFT DISTAL FEMORAL COMPRESS: NORMAL
BH CV LOWER VASCULAR LEFT GASTRONEMIUS COMPRESS: NORMAL
BH CV LOWER VASCULAR LEFT GREATER SAPH AK COMPRESS: NORMAL
BH CV LOWER VASCULAR LEFT GREATER SAPH BK COMPRESS: NORMAL
BH CV LOWER VASCULAR LEFT LESSER SAPH COMPRESS: NORMAL
BH CV LOWER VASCULAR LEFT MID FEMORAL AUGMENT: NORMAL
BH CV LOWER VASCULAR LEFT MID FEMORAL COMPETENT: NORMAL
BH CV LOWER VASCULAR LEFT MID FEMORAL COMPRESS: NORMAL
BH CV LOWER VASCULAR LEFT MID FEMORAL PHASIC: NORMAL
BH CV LOWER VASCULAR LEFT MID FEMORAL SPONT: NORMAL
BH CV LOWER VASCULAR LEFT PERONEAL COMPRESS: NORMAL
BH CV LOWER VASCULAR LEFT POPLITEAL AUGMENT: NORMAL
BH CV LOWER VASCULAR LEFT POPLITEAL COMPETENT: NORMAL
BH CV LOWER VASCULAR LEFT POPLITEAL COMPRESS: NORMAL
BH CV LOWER VASCULAR LEFT POPLITEAL PHASIC: NORMAL
BH CV LOWER VASCULAR LEFT POPLITEAL SPONT: NORMAL
BH CV LOWER VASCULAR LEFT POSTERIOR TIBIAL COMPRESS: NORMAL
BH CV LOWER VASCULAR LEFT PROXIMAL FEMORAL COMPRESS: NORMAL
BH CV LOWER VASCULAR LEFT SAPHENOFEMORAL JUNCTION COMPRESS: NORMAL
BH CV LOWER VASCULAR RIGHT COMMON FEMORAL AUGMENT: NORMAL
BH CV LOWER VASCULAR RIGHT COMMON FEMORAL COMPETENT: NORMAL
BH CV LOWER VASCULAR RIGHT COMMON FEMORAL COMPRESS: NORMAL
BH CV LOWER VASCULAR RIGHT COMMON FEMORAL PHASIC: NORMAL
BH CV LOWER VASCULAR RIGHT COMMON FEMORAL SPONT: NORMAL
BH CV VAS PRELIMINARY FINDINGS SCRIPTING: 1

## 2024-12-11 PROCEDURE — 97110 THERAPEUTIC EXERCISES: CPT | Performed by: PHYSICAL THERAPIST

## 2024-12-11 PROCEDURE — 97161 PT EVAL LOW COMPLEX 20 MIN: CPT | Performed by: PHYSICAL THERAPIST

## 2024-12-11 PROCEDURE — 93971 EXTREMITY STUDY: CPT | Performed by: SURGERY

## 2024-12-11 PROCEDURE — 99284 EMERGENCY DEPT VISIT MOD MDM: CPT

## 2024-12-11 PROCEDURE — 93971 EXTREMITY STUDY: CPT

## 2024-12-11 NOTE — ED TRIAGE NOTES
"Patient to ED from home with pain in the left leg and is concerned for DVT. She complains of intermittent numbness and tingling in the left leg if she \"sits too long\". The pain started behind the knee, and has radiated to upper thigh area.    Patient states she has a history of MTHFR, vericose veins but no history of DVT.   Upon arrival to ED, no swelling, no redness, ROM normal.   "

## 2024-12-11 NOTE — DISCHARGE INSTRUCTIONS
Please be sure to follow-up with your primary care provider.  You may try taking over-the-counter Tylenol or ibuprofen to help with your pain.  Continue to do the therapeutic exercises that were shown to you by the physical therapist.

## 2024-12-11 NOTE — ED PROVIDER NOTES
Time: 1:03 PM EST  Date of encounter:  12/11/2024  Independent Historian/Clinical History and Information was obtained by:   Patient    History is limited by: N/A    Chief Complaint: Leg pain      History of Present Illness:  Patient is a 34 y.o. year old female who presents to the emergency department for evaluation of left leg pain for the past 2 to 3 days.  Patient reports the pain started in the posterior left lower leg and has moved up to the upper thigh area.  Patient denies swelling or redness.  Patient reports pain with ambulation.  Denies back pain, denies fevers or chills.  Patient reports concern for DVT, states she has no history of DVT but has MTHFR mutation and is supposed to be taking baby aspirin daily, states she has not been taking in the past few days.  Patient also reports that she experiences intermittent numbness and tingling in the left upper thigh when she sits for too long or when she elevates her left leg.      Patient Care Team  Primary Care Provider: Nick Osman MD    Past Medical History:     No Known Allergies  Past Medical History:   Diagnosis Date    MTHFR mutation      History reviewed. No pertinent surgical history.  History reviewed. No pertinent family history.    Home Medications:  Prior to Admission medications    Medication Sig Start Date End Date Taking? Authorizing Provider   aspirin 81 MG chewable tablet Chew 1 tablet Daily.    Provider, MD Florencia   folic acid (FOLVITE) 1 MG tablet Take 1 tablet by mouth Daily.    Provider, MD Florencia   ibuprofen (ADVIL,MOTRIN) 600 MG tablet Take 1 tablet by mouth Every 6 (Six) Hours. 3/5/22   Faby Lewis MD        Social History:   Social History     Tobacco Use    Smoking status: Never    Smokeless tobacco: Never   Substance Use Topics    Alcohol use: Not Currently    Drug use: Not Currently         Review of Systems:  Review of Systems   Constitutional:  Negative for fever.   HENT:  Negative for sore throat.    Eyes:  "Negative.    Respiratory:  Negative for cough and shortness of breath.    Cardiovascular:  Negative for chest pain.   Gastrointestinal:  Negative for abdominal pain, diarrhea and vomiting.   Genitourinary:  Negative for dysuria.   Musculoskeletal:  Positive for myalgias. Negative for neck pain.   Skin:  Negative for rash.   Allergic/Immunologic: Negative.    Neurological:  Positive for numbness (intermittent). Negative for weakness and headaches.   Hematological: Negative.    Psychiatric/Behavioral: Negative.     All other systems reviewed and are negative.       Physical Exam:  /86 (BP Location: Right arm, Patient Position: Sitting)   Pulse 92   Temp 98.1 °F (36.7 °C) (Oral)   Resp 17   Ht 160 cm (63\")   Wt 64.2 kg (141 lb 8.6 oz)   LMP 12/04/2024 (Exact Date)   SpO2 99%   Breastfeeding No   BMI 25.07 kg/m²     Physical Exam  Vitals and nursing note reviewed.   Constitutional:       Appearance: Normal appearance. She is not ill-appearing or toxic-appearing.   HENT:      Head: Normocephalic.      Nose: Nose normal.   Eyes:      Extraocular Movements: Extraocular movements intact.      Conjunctiva/sclera: Conjunctivae normal.      Pupils: Pupils are equal, round, and reactive to light.   Cardiovascular:      Rate and Rhythm: Normal rate.      Pulses: Normal pulses.   Pulmonary:      Effort: Pulmonary effort is normal.   Abdominal:      General: Abdomen is flat. There is no distension.      Palpations: Abdomen is soft.   Musculoskeletal:      Cervical back: Normal range of motion and neck supple.      Lumbar back: Tenderness present. No bony tenderness.      Left upper leg: Tenderness (in deep tissue posteriorly) present. No swelling, edema, deformity or bony tenderness.      Left lower leg: No swelling, deformity or tenderness. No edema.   Skin:     General: Skin is warm and dry.      Capillary Refill: Capillary refill takes less than 2 seconds.   Neurological:      General: No focal deficit present. "      Mental Status: She is alert and oriented to person, place, and time.   Psychiatric:         Mood and Affect: Mood normal.              Medical Decision Making:      Comorbidities that affect care:    MTHFR mutation    External Notes reviewed:    Previous Admission Note: Previous hospital admission discharge summary from 3/3/2022 after obstetric delivery      The following orders were placed and all results were independently analyzed by me:  Orders Placed This Encounter   Procedures    PT Consult: Eval & Treat Functional Mobility Below Baseline    PT Plan of Care Cert / Re-Cert       Medications Given in the Emergency Department:  Medications - No data to display     ED Course:         Labs:    Lab Results (last 24 hours)       ** No results found for the last 24 hours. **             Imaging:    Duplex Venous Lower Extremity - Left CAR    Result Date: 12/11/2024    Normal left lower extremity venous duplex scan.        Differential Diagnosis and Discussion:    Extremity Pain: Differential diagnosis includes but is not limited to soft tissue sprain, tendonitis, tendon injury, dislocation, fracture, deep vein thrombosis, arterial insufficiency, osteoarthritis, bursitis, and ligamentous damage.    PROCEDURES:    Ultrasound impression was interpreted by me.     No orders to display       Procedures    MDM                     Patient Care Considerations:    LABS: I considered ordering labs, however no signs of infection, no erythema, no swelling, patient is afebrile.      Consultants/Shared Management Plan:    Consultant: I have discussed the case with Mary Daniels, PT who states she has evaluated the patient and provided her with therapeutic exercises for her left-sided sciatica.    Social Determinants of Health:    Patient is independent, reliable, and has access to care.       Disposition and Care Coordination:    Discharged: The patient is suitable and stable for discharge with no need for consideration of  admission.    I have explained the patient´s condition, diagnoses and treatment plan based on the information available to me at this time. I have answered questions and addressed any concerns. The patient has a good  understanding of the patient´s diagnosis, condition, and treatment plan as can be expected at this point. The vital signs have been stable. The patient´s condition is stable and appropriate for discharge from the emergency department.      The patient will pursue further outpatient evaluation with the primary care physician or other designated or consulting physician as outlined in the discharge instructions. They are agreeable to this plan of care and follow-up instructions have been explained in detail. The patient has received these instructions in written format and has expressed an understanding of the discharge instructions. The patient is aware that any significant change in condition or worsening of symptoms should prompt an immediate return to this or the closest emergency department or call to 911.  I have explained discharge medications and the need for follow up with the patient/caretakers. This was also printed in the discharge instructions. Patient was discharged with the following medications and follow up:      Medication List      No changes were made to your prescriptions during this visit.      Nick Osman MD  [address]  [facility] [address]  [phone]    Call in 2 days         Final diagnoses:   Left leg pain   Left sided sciatica        ED Disposition       ED Disposition   Discharge    Condition   Stable    Comment   --               This medical record created using voice recognition software.             Karla Barnett, BULL  12/11/24 3024

## 2024-12-11 NOTE — THERAPY EVALUATION
Patient Name: Charlene Malin  : 1990    MRN: [id number]                              Today's Date: 2024       Admit Date: 2024    Visit Dx:     ICD-10-CM ICD-9-CM   1. Left leg pain  M79.605 729.5   2. Left sided sciatica  M54.32 724.3     Patient Active Problem List   Diagnosis    Postpartum care and examination immediately after delivery     Past Medical History:   Diagnosis Date    MTHFR mutation      History reviewed. No pertinent surgical history.   General Information       Row Name 24 1552          Physical Therapy Time and Intention    Document Type evaluation  -LR     Mode of Treatment individual therapy  -LR       Row Name 24 1552          General Information    Patient Profile Reviewed yes  -LR     Prior Level of Function independent:  -LR               User Key  (r) = Recorded By, (t) = Taken By, (c) = Cosigned By      Initials Name Provider Type    LR Mary Madrigal, PT Physical Therapist                  History: Patient reports 1 week ago she started experiencing pain and numbness in the calf and side of her left thigh.  She reports a little bit of back pain on the left side.  Her pain is currently 3/10.  She states her pain is worse if she stays in 1 position for prolonged amount of time.    Objective:    Palpation: Tender to palpation at left lumbar paraspinals, left gastroc    ROM:  Lumbar ROM:  Flexion: WNL  Extension: WNL and painful  L Side bending: WNL  R Side bending: WNL  L Rotation: WNL  R Rotation: WNL    B hip, knee, and ankle ROM WNL  Slight L sciatic nerve tension     Strength:  L Hip MMT:   R Hip MMT:  Flexion: 5/5  Flexion: 5/5  Abduction: 5/5  Abduction: 5/5  Adduction: 5/5  Adduction: 5/5    L Knee MMT:  R Knee MMT:  Flexion: 5/5  Flexion: 5/5  Extension: 5/5  Extension: 5/5    L Ankle MMT:  R Ankle MMT:  DF: 5/5  DF: 5/5  PF: 5/5   PF: 5/5    Special Tests:  Quadrant Test: positive  Slump Test: negative B  Straight Leg Raise Test: positive on L, negative  on R  Contralateral Straight Leg Raise Test: negative B  Obers Test: NT     Sensation: B LE sensation intact to light touch    Assessment/Plan:   Pt presents with a diagnosis of left leg pain and numbness and has L sciatic nerve tension that are limiting her ability to sit for prolonged periods of time.  The patient was educated in exercises to help with sciatica and provided with HEP handout.    Goals:   LTG 1: The patient will be independent in HEP in order to decrease pain and improve tolerance to functional activities.  STATUS: Met    Interventions:   Manual Therapy: Not performed    Therapeutic Exercises: HEP: Piriformis stretch (3X 20 seconds on left), sciatic nerve glide (3X 20 seconds on left), prone press up (5X 15 seconds) seated sciatic nerve glide    Modalities: Not performed     Outcome Measures       Row Name 12/11/24 4282          Optimal Instrument    Optimal Instrument Optimal - 3  -LR     Sitting 2  -LR     Bending/Stooping 1  -LR     Standing 2  -LR     From the list, choose the 3 activities you would most like to be able to do without any difficulty Bending/stooping;Sitting;Standing  -LR     Total Score Optimal - 3 5  -LR       Row Name 12/11/24 9063          Functional Assessment    Outcome Measure Options Optimal Instrument  -LR               User Key  (r) = Recorded By, (t) = Taken By, (c) = Cosigned By      Initials Name Provider Type    Mary Vargas, PT Physical Therapist                     Time Calculation:   PT Evaluation Complexity  History, PT Evaluation Complexity: no personal factors and/or comorbidities  Examination of Body Systems (PT Eval Complexity): 1-2 elements  Clinical Presentation (PT Evaluation Complexity): stable  Clinical Decision Making (PT Evaluation Complexity): low complexity  Overall Complexity (PT Evaluation Complexity): low complexity     PT Charges       Row Name 12/11/24 5327             Time Calculation    PT Received On 12/11/24  -LR         Timed Charges     74881 - PT Therapeutic Exercise Minutes 8  -LR         Untimed Charges    PT Eval/Re-eval Minutes 11  -LR         Total Minutes    Timed Charges Total Minutes 8  -LR      Untimed Charges Total Minutes 11  -LR       Total Minutes 19  -LR                User Key  (r) = Recorded By, (t) = Taken By, (c) = Cosigned By      Initials Name Provider Type    LR Mary Madrigal, PT Physical Therapist                  Therapy Charges for Today       Code Description Service Date Service Provider Modifiers Qty    23188895140 HC PT THER PROC EA 15 MIN 12/11/2024 Mary Madrigal, PT GP 1    01014438652 HC PT EVAL LOW COMPLEXITY 1 12/11/2024 Mary Madrigal, PT GP 1            PT G-Codes  Outcome Measure Options: Optimal Instrument       Mary Madrigal, PT  12/11/2024